# Patient Record
Sex: FEMALE
[De-identification: names, ages, dates, MRNs, and addresses within clinical notes are randomized per-mention and may not be internally consistent; named-entity substitution may affect disease eponyms.]

---

## 2021-10-18 PROBLEM — Z00.00 ENCOUNTER FOR PREVENTIVE HEALTH EXAMINATION: Status: ACTIVE | Noted: 2021-10-18

## 2021-10-28 ENCOUNTER — APPOINTMENT (OUTPATIENT)
Dept: NEUROSURGERY | Facility: CLINIC | Age: 56
End: 2021-10-28
Payer: COMMERCIAL

## 2021-10-28 VITALS
TEMPERATURE: 97.9 F | HEART RATE: 72 BPM | WEIGHT: 177 LBS | OXYGEN SATURATION: 97 % | DIASTOLIC BLOOD PRESSURE: 76 MMHG | BODY MASS INDEX: 33.42 KG/M2 | SYSTOLIC BLOOD PRESSURE: 116 MMHG | HEIGHT: 61 IN

## 2021-10-28 DIAGNOSIS — M54.12 RADICULOPATHY, CERVICAL REGION: ICD-10-CM

## 2021-10-28 DIAGNOSIS — M54.42 LUMBAGO WITH SCIATICA, LEFT SIDE: ICD-10-CM

## 2021-10-28 DIAGNOSIS — G89.29 LUMBAGO WITH SCIATICA, LEFT SIDE: ICD-10-CM

## 2021-10-28 DIAGNOSIS — M54.2 CERVICALGIA: ICD-10-CM

## 2021-10-28 DIAGNOSIS — M54.9 DORSALGIA, UNSPECIFIED: ICD-10-CM

## 2021-10-28 PROCEDURE — 99204 OFFICE O/P NEW MOD 45 MIN: CPT

## 2021-10-28 NOTE — HISTORY OF PRESENT ILLNESS
[> 3 months] : more  than 3 months [FreeTextEntry1] : neck pain and right arm pain [de-identified] : 56 yr old female with history of neck several year history of neck pain. It has been worsening in the last 4 months, She has not done PT nor injections. The pain severity is 9/10. She has numbness of the arm worse on the right. She has difficulty  sleeping. She also has pain in the lower back with numbness of the left leg. She can walk as much as she wants.  She has weakness of the hands. She has dexterity problems. She has normal katelynn and bladder function. She history of bariatric surgery about 3 years ago. She 50 lbs.

## 2021-10-28 NOTE — ASSESSMENT
[FreeTextEntry1] : She has neck pain and radiculopathy but has not done conservative therapy. I will have her do PT and xrays as well as pain management. \par

## 2021-10-28 NOTE — PHYSICAL EXAM
[Spurling's - Opposite Side] : Positive Spurling's on opposite side [Spurling's Same Side] : Positive Spurling's on same side [Pain / Temp Decrease Sensory Level At Hands - Right Only] : C7 sensory impairment [Pain / Temp Decrease - Root / Radicular Distribution] : C8 sensory impairment [5] : 5/5 Small Finger Abduction (T1) [Extraocular Movements] : extraocular movements were intact